# Patient Record
Sex: MALE | Race: WHITE | Employment: UNEMPLOYED | URBAN - METROPOLITAN AREA
[De-identification: names, ages, dates, MRNs, and addresses within clinical notes are randomized per-mention and may not be internally consistent; named-entity substitution may affect disease eponyms.]

---

## 2019-09-05 ENCOUNTER — HOSPITAL ENCOUNTER (EMERGENCY)
Age: 39
Discharge: HOME | End: 2019-09-05
Attending: EMERGENCY MEDICINE
Payer: MEDICARE

## 2019-09-05 VITALS
TEMPERATURE: 98.6 F | OXYGEN SATURATION: 98 % | BODY MASS INDEX: 20.78 KG/M2 | HEIGHT: 72 IN | DIASTOLIC BLOOD PRESSURE: 98 MMHG | HEART RATE: 103 BPM | SYSTOLIC BLOOD PRESSURE: 136 MMHG | WEIGHT: 153.44 LBS | RESPIRATION RATE: 16 BRPM

## 2019-09-05 DIAGNOSIS — K08.89 PAIN, DENTAL: Primary | ICD-10-CM

## 2019-09-05 PROCEDURE — 99282 EMERGENCY DEPT VISIT SF MDM: CPT

## 2019-09-05 RX ORDER — IBUPROFEN 800 MG/1
800 TABLET ORAL EVERY 8 HOURS PRN
Qty: 21 TABLET | Refills: 0 | Status: SHIPPED | OUTPATIENT
Start: 2019-09-05 | End: 2019-09-10 | Stop reason: SDUPTHER

## 2019-09-05 RX ORDER — AMOXICILLIN 500 MG/1
500 CAPSULE ORAL 3 TIMES DAILY
Qty: 30 CAPSULE | Refills: 0 | Status: SHIPPED | OUTPATIENT
Start: 2019-09-05 | End: 2019-09-10 | Stop reason: SDUPTHER

## 2019-09-05 ASSESSMENT — PAIN DESCRIPTION - ORIENTATION: ORIENTATION: RIGHT;UPPER

## 2019-09-05 ASSESSMENT — PAIN - FUNCTIONAL ASSESSMENT: PAIN_FUNCTIONAL_ASSESSMENT: 0-10

## 2019-09-05 ASSESSMENT — PAIN DESCRIPTION - DESCRIPTORS: DESCRIPTORS: THROBBING

## 2019-09-05 ASSESSMENT — PAIN DESCRIPTION - PAIN TYPE: TYPE: ACUTE PAIN

## 2019-09-05 ASSESSMENT — PAIN SCALES - GENERAL
PAINLEVEL_OUTOF10: 8
PAINLEVEL_OUTOF10: 8

## 2019-09-05 ASSESSMENT — PAIN DESCRIPTION - FREQUENCY: FREQUENCY: CONTINUOUS

## 2019-09-05 ASSESSMENT — PAIN DESCRIPTION - LOCATION: LOCATION: TEETH

## 2019-09-05 NOTE — ED PROVIDER NOTES
COURSE/MDM  Dental abscess, periapical abscess: Warm compresses. Ibuprofen as needed. Amoxicillin 500 mg 3 times daily x10 days. Follow-up with the dentist within 1 week. Hypertension:  Patient was hypertensive during the ER visit today. There are no signs of hypertensive emergency or evidence of end organ damage by history or physical exam.  These findings were discussed with the patient and advised to follow up with primary care physician to further assess and treat hypertension in the outpatient setting. Patient was given scripts for the following medications. I counseled patient how to take these medications. New Prescriptions    AMOXICILLIN (AMOXIL) 500 MG CAPSULE    Take 1 capsule by mouth 3 times daily for 10 days    IBUPROFEN (ADVIL;MOTRIN) 800 MG TABLET    Take 1 tablet by mouth every 8 hours as needed for Pain         CLINICAL IMPRESSION  1. Pain, dental        Blood pressure (!) 136/98, pulse 103, temperature 98.6 °F (37 °C), temperature source Oral, resp. rate 16, height 6' (1.829 m), weight 153 lb 7 oz (69.6 kg), SpO2 98 %.       Follow-up with:  Dakota Loving MD  [facility][note 3] [employer] [address]: [place] [address] [facility]  [phone]    In 3 days  If symptoms worsen    Your dentist    In 1 week          [place] MD  09/05/19 6674

## 2019-09-05 NOTE — ED TRIAGE NOTES
Pt to ED with complaint of dental pain. States lost a right upper molar 3 months ago, started feeling pain and developed swelling 3 days ago.  States last dose of Tylenol was this am. Visible edema noted to outer right side of mouth

## 2019-09-10 ENCOUNTER — OFFICE VISIT (OUTPATIENT)
Dept: INTERNAL MEDICINE CLINIC | Age: 39
End: 2019-09-10
Payer: MEDICARE

## 2019-09-10 VITALS
HEIGHT: 72 IN | DIASTOLIC BLOOD PRESSURE: 80 MMHG | BODY MASS INDEX: 21.54 KG/M2 | SYSTOLIC BLOOD PRESSURE: 124 MMHG | HEART RATE: 97 BPM | TEMPERATURE: 98 F | WEIGHT: 159 LBS | OXYGEN SATURATION: 98 %

## 2019-09-10 DIAGNOSIS — K04.7 DENTAL ABSCESS: Primary | ICD-10-CM

## 2019-09-10 PROCEDURE — 99203 OFFICE O/P NEW LOW 30 MIN: CPT | Performed by: INTERNAL MEDICINE

## 2019-09-10 RX ORDER — IBUPROFEN 800 MG/1
800 TABLET ORAL EVERY 8 HOURS PRN
Qty: 50 TABLET | Refills: 0 | Status: SHIPPED | OUTPATIENT
Start: 2019-09-10 | End: 2020-11-26

## 2019-09-10 RX ORDER — AMOXICILLIN 500 MG/1
500 CAPSULE ORAL 3 TIMES DAILY
Qty: 21 CAPSULE | Refills: 0 | Status: SHIPPED | OUTPATIENT
Start: 2019-09-10 | End: 2019-09-20

## 2019-09-10 ASSESSMENT — ENCOUNTER SYMPTOMS
RESPIRATORY NEGATIVE: 1
ABDOMINAL PAIN: 1

## 2020-11-26 ENCOUNTER — HOSPITAL ENCOUNTER (EMERGENCY)
Age: 40
Discharge: HOME | End: 2020-11-26
Attending: EMERGENCY MEDICINE
Payer: COMMERCIAL

## 2020-11-26 VITALS
RESPIRATION RATE: 16 BRPM | HEIGHT: 71 IN | HEART RATE: 102 BPM | DIASTOLIC BLOOD PRESSURE: 85 MMHG | SYSTOLIC BLOOD PRESSURE: 142 MMHG | BODY MASS INDEX: 23.92 KG/M2 | TEMPERATURE: 97 F | WEIGHT: 170.86 LBS | OXYGEN SATURATION: 98 %

## 2020-11-26 PROCEDURE — 99284 EMERGENCY DEPT VISIT MOD MDM: CPT

## 2020-11-26 PROCEDURE — 6370000000 HC RX 637 (ALT 250 FOR IP): Performed by: EMERGENCY MEDICINE

## 2020-11-26 RX ORDER — PENICILLIN V POTASSIUM 250 MG/1
500 TABLET ORAL ONCE
Status: COMPLETED | OUTPATIENT
Start: 2020-11-26 | End: 2020-11-26

## 2020-11-26 RX ORDER — PENICILLIN V POTASSIUM 500 MG/1
500 TABLET ORAL 4 TIMES DAILY
Qty: 40 TABLET | Refills: 0 | Status: SHIPPED | OUTPATIENT
Start: 2020-11-26 | End: 2020-12-06

## 2020-11-26 RX ADMIN — PENICILLIN V POTASIUM 500 MG: 250 TABLET ORAL at 12:53

## 2020-11-26 ASSESSMENT — PAIN DESCRIPTION - PAIN TYPE: TYPE: ACUTE PAIN

## 2020-11-26 ASSESSMENT — ENCOUNTER SYMPTOMS
COLOR CHANGE: 0
FACIAL SWELLING: 0
NAUSEA: 0
SHORTNESS OF BREATH: 0
VOMITING: 0
EYE REDNESS: 0
TROUBLE SWALLOWING: 0
SINUS PAIN: 0
ABDOMINAL PAIN: 0
CHEST TIGHTNESS: 0
PHOTOPHOBIA: 0
EYE PAIN: 0
COUGH: 0
SINUS PRESSURE: 0
SORE THROAT: 0

## 2020-11-26 ASSESSMENT — PAIN - FUNCTIONAL ASSESSMENT: PAIN_FUNCTIONAL_ASSESSMENT: ACTIVITIES ARE NOT PREVENTED

## 2020-11-26 ASSESSMENT — PAIN DESCRIPTION - DESCRIPTORS: DESCRIPTORS: DISCOMFORT

## 2020-11-26 ASSESSMENT — PAIN DESCRIPTION - ORIENTATION: ORIENTATION: UPPER

## 2020-11-26 ASSESSMENT — PAIN DESCRIPTION - LOCATION: LOCATION: MOUTH;TEETH

## 2020-11-26 ASSESSMENT — PAIN SCALES - GENERAL: PAINLEVEL_OUTOF10: 2

## 2020-11-26 NOTE — ED PROVIDER NOTES
[address] ENCOUNTER        Pt Name: Hazel Danielle  MRN: [id number]  Armstrongfjess[place] 1980  Date of evaluation: 11/26/2020  Provider: Keiko Sher MD  PCP: Luis Carlos Bejarano MD      CHIEF COMPLAINT       Chief Complaint   Patient presents with    Dental Problem     was told \"tooth abscess \" by dentist this morning. HISTORY OFPRESENT ILLNESS   (Location/Symptom, Timing/Onset, Context/Setting, Quality, Duration, Modifying Factors,Severity)  Note limiting factors. Hazel Danielle is a 45 y.o. male presenting today due to concern for left upper incisor pain starting 4 days ago with some swelling above that tooth and being concerned that he is developing a dental abscess. He called his dentist but was told there was no one on call today since it was Thanksgiving and therefore he called another dentist and ultimately was told to go to the emergency department if you would need an antibiotic since they cannot prescribe anything over the phone. He did not actually see a dentist today. Besides dental pain, he denies any headache, visual changes, sore throat, trouble swallowing, trouble breathing, chest pain, shortness of breath, fever, nausea, vomiting, hearing change, facial swelling (other than slight swelling near the tooth), other concerning symptoms. He states that he can get in with his dentist in an urgent manner but due to it being a holiday, he was unable to today and thought he needed an antibiotic. Pain is currently mild and controlled. It is worse with eating. REVIEW OF SYSTEMS    (2-9 systems for level 4, 10 or more for level 5)     Review of Systems   Constitutional: Negative for chills, diaphoresis, fatigue and fever. HENT: Positive for dental problem. Negative for congestion, drooling, ear pain, facial swelling, hearing loss, postnasal drip, sinus pressure, sinus pain, sore throat and trouble swallowing.     Eyes: Negative for photophobia, pain, redness and visual disturbance. Respiratory: Negative for cough, chest tightness and shortness of breath. Cardiovascular: Negative for chest pain. Gastrointestinal: Negative for abdominal pain, nausea and vomiting. Musculoskeletal: Negative for neck pain. Skin: Negative for color change, rash and wound. Neurological: Negative for weakness, numbness and headaches. Psychiatric/Behavioral: Negative for confusion. Positives and Pertinent negatives as per HPI. PASTMEDICAL HISTORY     Past Medical History:   Diagnosis Date    [facility] [address]     Cerebral palsy ([facility] [address] 1980    since birth. surgery of both lower legs Archiles tendons. SURGICAL HISTORY       Past Surgical History:   Procedure Laterality Date    EYE SURGERY      EYE SURGERY      LEG SURGERY      TYMPANOSTOMY TUBE PLACEMENT           CURRENT MEDICATIONS       Discharge Medication List as of 2020 12:47 PM          ALLERGIES     Patient has no known allergies.     FAMILY HISTORY       Family History   Problem Relation Age of Onset    Stroke Mother           SOCIAL HISTORY       Social History     Socioeconomic History    Marital status: Single     Spouse name: None    Number of children: None    Years of education: None    Highest education level: None   Occupational History    None   Social Needs    Financial resource strain: None    Food insecurity     Worry: None     Inability: None    Transportation needs     Medical: None     Non-medical: None   Tobacco Use    Smoking status: Former Smoker     Packs/day: 1.00     Last attempt to quit: 9/10/2018     Years since quittin.2    Smokeless tobacco: Never Used   Substance and Sexual Activity    Alcohol use: No    Drug use: No    Sexual activity: None   Lifestyle    Physical activity     Days per week: None     Minutes per session: None    Stress: None   Relationships    Social connections     Talks on phone: None     Gets dental caries present. No gingival swelling, dental abscesses or gum lesions. Tongue: No lesions. Tongue does not deviate from midline. Palate: No mass and lesions. Pharynx: Oropharynx is clear. Uvula midline. No pharyngeal swelling, oropharyngeal exudate, posterior oropharyngeal erythema or uvula swelling. Eyes:      General: No scleral icterus. Right eye: No discharge. Left eye: No discharge. Extraocular Movements: Extraocular movements intact. Conjunctiva/sclera: Conjunctivae normal.      Pupils: Pupils are equal, round, and reactive to light. Neck:      Musculoskeletal: Full passive range of motion without pain, normal range of motion and neck supple. Normal range of motion. No edema, erythema, neck rigidity, crepitus, injury, pain with movement, torticollis, spinous process tenderness or muscular tenderness. Thyroid: No thyroid tenderness. Trachea: Trachea and phonation normal. No tracheal tenderness, tracheostomy, abnormal tracheal secretions or tracheal deviation. Cardiovascular:      Rate and Rhythm: Normal rate and regular rhythm. Pulses: Normal pulses. Pulmonary:      Effort: Pulmonary effort is normal. No tachypnea, bradypnea, accessory muscle usage, prolonged expiration, respiratory distress or retractions. He is not intubated. Breath sounds: Normal breath sounds and air entry. No stridor, decreased air movement or transmitted upper airway sounds. No decreased breath sounds, wheezing, rhonchi or rales. Chest:      Chest wall: No tenderness. Musculoskeletal: Normal range of motion. General: No swelling, tenderness, deformity or signs of injury. Lymphadenopathy:      Cervical: No cervical adenopathy. Right cervical: No superficial cervical adenopathy. Left cervical: No superficial cervical adenopathy. Skin:     General: Skin is warm and dry. Coloration: Skin is not jaundiced or pale.       Findings: No bruising, erythema, lesion or rash. Neurological:      General: No focal deficit present. Mental Status: He is alert and oriented to person, place, and time. Mental status is at baseline. GCS: GCS eye subscore is 4. GCS verbal subscore is 5. GCS motor subscore is 6. Motor: No weakness, tremor, atrophy, abnormal muscle tone or seizure activity. Psychiatric:         Mood and Affect: Mood normal.         Behavior: Behavior normal. Behavior is cooperative. DIAGNOSTIC RESULTS   :    Labs Reviewed - No data to display    All other labs were within normal range or not returned asof this dictation. EKG: All EKG's are interpreted by the Emergency Department Physician who either signs or Co-signs this chart in the absence of a cardiologist.        RADIOLOGY:   Non-plain film images such as CT, Ultrasound and MRI are read by the radiologist. Jay Poonam images are visualized and preliminarily interpreted by the  ED Provider with the belowfindings:        Interpretation per the Radiologist below, if available at the time of this note:    No orders to display         PROCEDURES   Unless otherwise noted below, none     Procedures    CRITICAL CARE TIME   N/A    CONSULTS:  None    EMERGENCY DEPARTMENT COURSE and DIFFERENTIAL DIAGNOSIS/MDM:   Vitals:    Vitals:    11/26/20 1201 11/26/20 1252   BP: (!) 146/89 (!) 142/85   Pulse: 116 102   Resp: 14 16   Temp: 97 °F (36.1 °C)    TempSrc: Oral    SpO2: 98% 98%   Weight: 170 lb 13.7 oz (77.5 kg)    Height: 5' 10.5\" (1.791 m)        Patient was given the following medications:  Medications   penicillin v potassium (VEETID) tablet 500 mg (500 mg Oral Given 11/26/20 1253)     Patient was evaluated due to concern for dental pain starting a few days ago and now having some swelling to the left upper jaw. He had no significant tenderness on exam other than mild discomfort near the left upper central incisor.   Besides dental infection, I did consider mucomycosis, cavernous sinus thrombosis, Morales's angina, bacterial tracheitis, gingivitis, amongst other pathology. At this time, we will try penicillin to see if this helps with the possible infection. Otherwise, he knows to return to the emergency department in the next 3 to 12 hours for any worsening pain with trouble breathing, trouble swallowing, other concerning symptoms, but otherwise follow-up with his dentist over the next 2 to 4 days for repeat evaluation. He was well-appearing in no acute distress when I saw him and felt comfortable with this plan. He also was informed to have his blood pressure rechecked once the dental infection improves and if still elevated, he may need medication for this. He did have mild sinus tachycardia in the emergency department that improved without any intervention and at this time I do believe it is related to the dental discomfort and infection and I have low suspicion for sepsis or pulmonary embolism at this time. He denies any chest pain or shortness of breath today. The patient tolerated their visit well. The patient and / or the family were informed of the results of any tests, a time was given to answer questions. FINAL IMPRESSION      1. Dental infection    2. Toothache    3.  Elevated blood pressure reading          DISPOSITION/PLAN   DISPOSITION Decision To Discharge 11/26/2020 12:45:19 PM      PATIENT REFERRED TO:  [facility]  [facility] [address] [id number]  [phone]  Go to   If symptoms worsen    Kassidy Harley MD  [address] [place] [address]  [phone]    Call   As needed    Your dentist    In 1 day        DISCHARGEMEDICATIONS:  Discharge Medication List as of 11/26/2020 12:47 PM      START taking these medications    Details   penicillin v potassium (VEETID) 500 MG tablet Take 1 tablet by mouth 4 times daily for 10 days, Disp-40 tablet,R-0Print             DISCONTINUED MEDICATIONS:  Discharge Medication List as of 11/26/2020 12:47 PM      STOP taking these medications       ibuprofen (ADVIL;MOTRIN) 800 MG tablet Comments:   Reason for Stopping:                      (Please note that portions of this note were completed with a voicerecognition program.  Efforts were made to edit the dictations but occasionally words are mis-transcribed.)    Kellen Tyler MD (electronically signed)            Kellen Tyler MD  11/26/20 Dianne Samson MD  11/26/20 2091